# Patient Record
(demographics unavailable — no encounter records)

---

## 2025-04-28 NOTE — ASSESSMENT
[Vaccines Reviewed] : Immunizations reviewed today. Please see immunization details in the vaccine log within the immunization flowsheet.  [FreeTextEntry1] : BPH - symptomatic. Increase Tamsulosin to 0.8 mg. Possible adverse effects discussed with pt.  follow up in office in 2-3 months to assess response obesity, prediabetes - Pt to start zepbound. Possible adverse effects discussed with pt, including but not limited to nausea/vomiting, abdominal cramps/pain, diarrhea, ileus. Patient has no hx of pancreatitis or family/personal hx of medullary thyroid cancer. Follow up in 1 month to reassess weight and assess if patient is tolerating medication well  abdominal discomfort, diarrhea, constipation. Likely to improve with better diet, fiber. Had CT abdomen in 2022. Will check pancreatic enzymes.  chronic low back pain w/ sciatica - cont f/u with pain mgmt htn, hld, cont meds. f/u with cardio hypothyroidism, thyroid nodules - refill levothyroxine. f/u with endo Encouraged continued lifestyle modifications including diet and exercise for weight loss  Healthy diet and regular exercise regimen discussed w/ pt. Any questions call office

## 2025-04-28 NOTE — HEALTH RISK ASSESSMENT
[0] : 2) Feeling down, depressed, or hopeless: Not at all (0) [PHQ-2 Negative - No further assessment needed] : PHQ-2 Negative - No further assessment needed [Never] : Never [Patient reported colonoscopy was normal] : Patient reported colonoscopy was normal [[id number]] : 0 [ColonoscopyDate] : 02/22

## 2025-04-28 NOTE — HISTORY OF PRESENT ILLNESS
[de-identified] : Pt in office for CPE.  Pt has hx of htn, hld, hypothyroidism, prediabetes. Pt controlled on metoprolol, valsartan-hctz, amlodipine, atorvastatin. Pt has gained about 40 lbs since last year. is hoping he can start on injectable weight loss med. Started new diet today.  Pt c/o knee pain, back pain. Epidurals every 3 months with pain management.  P c/o fatigue, does not sleep well.  Pt has BPH, Nocturia 1-2 x nightly.  Pt has right abdominal discomfort, bouts of diarrhea and constipation x 1.5 months. Last colonoscopy in 2022. Has concerns about cancer.  Nonsmoker ETOH use 2 glasses wine/day Drug use denies Exercises as tolerated due to chronic back pains. Weight gain, +40 lbs, started diet today.  Works as  in . Pt also is volunteer  , has 3 children UTD w/ shingrix x 2 in 2024. Had prevnar 20 in 11/2024, had Flu shot.

## 2025-04-28 NOTE — HEALTH RISK ASSESSMENT
[0] : 2) Feeling down, depressed, or hopeless: Not at all (0) [PHQ-2 Negative - No further assessment needed] : PHQ-2 Negative - No further assessment needed [Never] : Never [Patient reported colonoscopy was normal] : Patient reported colonoscopy was normal [[id number]] : 0 [ColonoscopyDate] : 02/22

## 2025-04-28 NOTE — HISTORY OF PRESENT ILLNESS
[de-identified] : Pt in office for CPE.  Pt has hx of htn, hld, hypothyroidism, prediabetes. Pt controlled on metoprolol, valsartan-hctz, amlodipine, atorvastatin. Pt has gained about 40 lbs since last year. is hoping he can start on injectable weight loss med. Started new diet today.  Pt c/o knee pain, back pain. Epidurals every 3 months with pain management.  P c/o fatigue, does not sleep well.  Pt has BPH, Nocturia 1-2 x nightly.  Pt has right abdominal discomfort, bouts of diarrhea and constipation x 1.5 months. Last colonoscopy in 2022. Has concerns about cancer.  Nonsmoker ETOH use 2 glasses wine/day Drug use denies Exercises as tolerated due to chronic back pains. Weight gain, +40 lbs, started diet today.  Works as  in . Pt also is volunteer  , has 3 children UTD w/ shingrix x 2 in 2024. Had prevnar 20 in 11/2024, had Flu shot.

## 2025-05-19 NOTE — HISTORY OF PRESENT ILLNESS
[de-identified] : Pt f/u obesity, htn Started zepbound 2.5mg, tolerating well. Pt has noticed lower BP 91/50 since losing weight. Pt stopped flomax and decreased valsartan to 160mg instead for 320mg which helped BP normalize. Has lost 9 lbs since starting med. Pt trying to decrease portions and will start trying to exercise regularly. Pt reports hx of thyroid nodules, was following with endo in the past, due to recheck US.

## 2025-05-19 NOTE — ASSESSMENT
[FreeTextEntry1] : obesity - Advised lifestyle modifications for wt loss. Healthy diet and regular exercise regimen discussed w/ pt. Tolerating zepbound well, increase to 5mg/week.  follow up in office in 2-3 months. If pt needs increase in dose can call and increase to 7.5mg dose before then htn- controlled today, cont valsartan 160mg dose thyroid nodule - monitor, check US thyroid

## 2025-07-15 NOTE — ASSESSMENT
[FreeTextEntry1] : obesity - Advised lifestyle modifications for wt loss. Healthy diet and regular exercise regimen discussed w/ pt. Tolerating zepbound well, 10mg/week.  follow up in office in 3 months. If pt needs increase in dose can call and increase to 12.5mg dose before then htn- has been controlled but having urinary issues cont valsartan 160mg dose, decrease hctz to 12.5mg BPH - restart tamsulosin .4mg qhs

## 2025-07-15 NOTE — HISTORY OF PRESENT ILLNESS
[Home] : at home, [unfilled] , at the time of the visit. [Medical Office: ([facility])___] : at the medical office located in  [Telehealth (audio & video)] : This visit was provided via telehealth using real-time 2-way audio visual technology. [Verbal consent obtained from patient] : the patient, [unfilled] [de-identified] : Pt f/u obesity, htn. Pt now on zepbound 7.5mg, tolerating well will increase to 10mg. Pt has noticed lower BP was getting too low 90s/60s so pt stopped flomax and decreased valsartan to 160mg instead for 320mg which helped BP normalize but since he's off flomax his urination has worsened. Now down to 213. since starting med. Pt trying to decrease portions and will start trying to exercise regularly. Pt reports hx of thyroid nodules, was following with endo in the past, due to recheck US.